# Patient Record
Sex: MALE | Race: WHITE
[De-identification: names, ages, dates, MRNs, and addresses within clinical notes are randomized per-mention and may not be internally consistent; named-entity substitution may affect disease eponyms.]

---

## 2019-02-07 ENCOUNTER — HOSPITAL ENCOUNTER (EMERGENCY)
Dept: HOSPITAL 62 - ER | Age: 19
Discharge: HOME | End: 2019-02-07
Payer: OTHER GOVERNMENT

## 2019-02-07 VITALS — DIASTOLIC BLOOD PRESSURE: 55 MMHG | SYSTOLIC BLOOD PRESSURE: 128 MMHG

## 2019-02-07 DIAGNOSIS — R50.9: ICD-10-CM

## 2019-02-07 DIAGNOSIS — R51: ICD-10-CM

## 2019-02-07 DIAGNOSIS — J02.0: Primary | ICD-10-CM

## 2019-02-07 DIAGNOSIS — M79.10: ICD-10-CM

## 2019-02-07 PROCEDURE — 99283 EMERGENCY DEPT VISIT LOW MDM: CPT

## 2019-02-07 PROCEDURE — 96372 THER/PROPH/DIAG INJ SC/IM: CPT

## 2019-02-07 PROCEDURE — 87880 STREP A ASSAY W/OPTIC: CPT

## 2019-02-07 NOTE — ER DOCUMENT REPORT
HPI





- HPI


Patient complains to provider of: Sore throat


Time Seen by Provider: 02/07/19 17:53


Onset/Duration: Persistent


Quality of pain: Achy


Pain Level: 5


Context: 





Patient presents with 4-day history of fever and sore throat.  Patient states he

had vomiting yesterday that resolved today.  Patient complains of swollen 

tonsils.  Patient did see his primary doctor yesterday and was given a pr

escription for throat lozenges and Motrin.


Associated Symptoms: Body/muscle aches, Fever, Nausea, Vomiting, Sore throat


Exacerbated by: Denies


Relieved by: Denies


Similar symptoms previously: Yes


Recently seen / treated by doctor: Yes





- ROS


ROS below otherwise negative: Yes


Systems Reviewed and Negative: Yes All other systems reviewed and negative





- CONSTITUTIONAL


Constitutional: REPORTS: Fever, Chills





- EENT


EENT: REPORTS: Sore Throat





- NEURO


Neurology: REPORTS: Headache





- RESPIRATORY


Respiratory: DENIES: Coughing





- GASTROINTESTINAL


Gastrointestinal: REPORTS: Patient vomiting.  DENIES: Abdominal Pain





- MUSCULOSKELETAL


Musculoskeletal: DENIES: Back Pain, Neck Pain





- DERM


Skin Color: Normal


Skin Problems: None





Past Medical History





- General


Information source: Patient





- Social History


Smoking Status: Never Smoker


Frequency of alcohol use: None


Drug Abuse: None


Occupation: Active duty 


Family History: Reviewed & Not Pertinent


Patient has suicidal ideation: No


Patient has homicidal ideation: No





- Medical History


Medical History: Negative


Renal/ Medical History: Denies: Hx Peritoneal Dialysis


Surgical Hx: Negative





Vertical Provider Document





- CONSTITUTIONAL


Agree With Documented VS: Yes


Exam Limitations: No Limitations


General Appearance: WD/WN, No Apparent Distress





- INFECTION CONTROL


TRAVEL OUTSIDE OF THE [place] IN LAST 30 DAYS: No





- HEENT


HEENT: Atraumatic, Normocephalic, Pharyngeal Exudate, Pharyngeal Tenderness, 

Pharyngeal Erythema.  negative: Tympanic Membrane Red, Tympanic Membrane Bulging


Notes: 





Patient able to manage oral secretions.





- NECK


Neck: Lymphadenopathy-Left, Lymphadenopathy-Right





- RESPIRATORY


Respiratory: Breath Sounds Normal, No Respiratory Distress, Chest Non-Tender





- CARDIOVASCULAR


Cardiovascular: Regular Rate, Regular Rhythm, No Murmur





- BACK


Back: Normal Inspection





- MUSCULOSKELETAL/EXTREMETIES


Musculoskeletal/Extremeties: MAEW, FROM





- NEURO


Level of Consciousness: Awake, Alert, Appropriate


Motor/Sensory: No Motor Deficit





- DERM


Integumentary: Warm, Dry, No Rash





Course





- Re-evaluation


Re-evalutation: 





02/07/19 18:36


Patient nontoxic in appearance.  Patient able to manage oral secretions.  No 

concern for peritonsillar abscess.  Patient encouraged to increase oral fluids 

to stay well-hydrated.





- Vital Signs


Vital signs: 


                                        











Temp Pulse Resp BP Pulse Ox


 


 100.9 F H  82   16   110/54 L  100 


 


 02/07/19 17:50  02/07/19 17:50  02/07/19 17:50  02/07/19 17:50  02/07/19 17:50














- Laboratory


Laboratory results interpreted by me: 





02/07/19 18:36


                               Labs- Entire Visit











  02/07/19





  18:01


 


Group A Strep Rapid  POSITIVE














Discharge





- Discharge


Clinical Impression: 


 Strep throat





Fever


Qualifiers:


 Fever type: unspecified Qualified Code(s): R50.9 - Fever, unspecified





Condition: Stable


Disposition: HOME, SELF-CARE


Instructions:  Antibiotic Shot ([facility]), Antinausea Medication ([facility]), Strep Throat 

([facility])


Additional Instructions: 


Return immediately for any new or worsening symptoms





Followup with your primary care provider, call tomorrow to make a followup 

appointment





Increase oral fluids and stay well-hydrated.





Take the Motrin that you were prescribed as directed.


Prescriptions: 


Ondansetron HCl [Zofran 4 mg Tablet] 1 - 2 tab PO Q6 PRN #10 tablet


 PRN Reason: 


Forms:  Return to Work


Referrals: 


[facility] [Provider Group] - Follow up as needed